# Patient Record
Sex: FEMALE | Race: WHITE | Employment: FULL TIME | URBAN - METROPOLITAN AREA
[De-identification: names, ages, dates, MRNs, and addresses within clinical notes are randomized per-mention and may not be internally consistent; named-entity substitution may affect disease eponyms.]

---

## 2021-04-28 ENCOUNTER — APPOINTMENT (OUTPATIENT)
Dept: CT IMAGING | Age: 51
End: 2021-04-28
Payer: COMMERCIAL

## 2021-04-28 ENCOUNTER — HOSPITAL ENCOUNTER (EMERGENCY)
Age: 51
Discharge: HOME | End: 2021-04-28
Attending: EMERGENCY MEDICINE
Payer: COMMERCIAL

## 2021-04-28 VITALS
OXYGEN SATURATION: 97 % | DIASTOLIC BLOOD PRESSURE: 70 MMHG | WEIGHT: 110 LBS | HEART RATE: 58 BPM | SYSTOLIC BLOOD PRESSURE: 132 MMHG | RESPIRATION RATE: 18 BRPM | HEIGHT: 62 IN | BODY MASS INDEX: 20.24 KG/M2 | TEMPERATURE: 97.6 F

## 2021-04-28 DIAGNOSIS — S09.90XA INJURY OF HEAD, INITIAL ENCOUNTER: ICD-10-CM

## 2021-04-28 DIAGNOSIS — S02.610A CLOSED FRACTURE OF CONDYLAR PROCESS OF MANDIBLE, UNSPECIFIED LATERALITY, INITIAL ENCOUNTER (HCC): Primary | ICD-10-CM

## 2021-04-28 DIAGNOSIS — R55 SYNCOPE AND COLLAPSE: ICD-10-CM

## 2021-04-28 DIAGNOSIS — S01.81XA CHIN LACERATION, INITIAL ENCOUNTER: ICD-10-CM

## 2021-04-28 LAB
A/G RATIO: 1.7 (ref 1.1–2.2)
ALBUMIN SERPL-MCNC: 4 G/DL (ref 3.4–5)
ALP BLD-CCNC: 84 U/L (ref 40–129)
ALT SERPL-CCNC: 11 U/L (ref 10–40)
ANION GAP SERPL CALCULATED.3IONS-SCNC: 10 MMOL/L (ref 3–16)
AST SERPL-CCNC: 20 U/L (ref 15–37)
BASOPHILS ABSOLUTE: 0.1 K/UL (ref 0–0.2)
BASOPHILS RELATIVE PERCENT: 0.9 %
BILIRUB SERPL-MCNC: <0.2 MG/DL (ref 0–1)
BUN BLDV-MCNC: 7 MG/DL (ref 7–20)
CALCIUM SERPL-MCNC: 8.4 MG/DL (ref 8.3–10.6)
CHLORIDE BLD-SCNC: 102 MMOL/L (ref 99–110)
CO2: 21 MMOL/L (ref 21–32)
CREAT SERPL-MCNC: 0.7 MG/DL (ref 0.6–1.1)
EKG ATRIAL RATE: 47 BPM
EKG DIAGNOSIS: NORMAL
EKG P AXIS: 76 DEGREES
EKG P-R INTERVAL: 148 MS
EKG Q-T INTERVAL: 432 MS
EKG QRS DURATION: 72 MS
EKG QTC CALCULATION (BAZETT): 382 MS
EKG R AXIS: 78 DEGREES
EKG T AXIS: 59 DEGREES
EKG VENTRICULAR RATE: 47 BPM
EOSINOPHILS ABSOLUTE: 0.1 K/UL (ref 0–0.6)
EOSINOPHILS RELATIVE PERCENT: 1.4 %
GFR AFRICAN AMERICAN: >60
GFR NON-AFRICAN AMERICAN: >60
GLOBULIN: 2.4 G/DL
GLUCOSE BLD-MCNC: 110 MG/DL (ref 70–99)
HCT VFR BLD CALC: 35.6 % (ref 36–48)
HEMOGLOBIN: 11.9 G/DL (ref 12–16)
LYMPHOCYTES ABSOLUTE: 1.6 K/UL (ref 1–5.1)
LYMPHOCYTES RELATIVE PERCENT: 24.9 %
MCH RBC QN AUTO: 30 PG (ref 26–34)
MCHC RBC AUTO-ENTMCNC: 33.3 G/DL (ref 31–36)
MCV RBC AUTO: 90.3 FL (ref 80–100)
MONOCYTES ABSOLUTE: 0.5 K/UL (ref 0–1.3)
MONOCYTES RELATIVE PERCENT: 8.7 %
NEUTROPHILS ABSOLUTE: 4 K/UL (ref 1.7–7.7)
NEUTROPHILS RELATIVE PERCENT: 64.1 %
PDW BLD-RTO: 14.7 % (ref 12.4–15.4)
PLATELET # BLD: 197 K/UL (ref 135–450)
PMV BLD AUTO: 7.1 FL (ref 5–10.5)
POTASSIUM REFLEX MAGNESIUM: 4.4 MMOL/L (ref 3.5–5.1)
RBC # BLD: 3.94 M/UL (ref 4–5.2)
SODIUM BLD-SCNC: 133 MMOL/L (ref 136–145)
TOTAL PROTEIN: 6.4 G/DL (ref 6.4–8.2)
TROPONIN: <0.01 NG/ML
WBC # BLD: 6.2 K/UL (ref 4–11)

## 2021-04-28 PROCEDURE — 84484 ASSAY OF TROPONIN QUANT: CPT

## 2021-04-28 PROCEDURE — 6370000000 HC RX 637 (ALT 250 FOR IP): Performed by: PHYSICIAN ASSISTANT

## 2021-04-28 PROCEDURE — 85025 COMPLETE CBC W/AUTO DIFF WBC: CPT

## 2021-04-28 PROCEDURE — 72125 CT NECK SPINE W/O DYE: CPT

## 2021-04-28 PROCEDURE — 70486 CT MAXILLOFACIAL W/O DYE: CPT

## 2021-04-28 PROCEDURE — 99284 EMERGENCY DEPT VISIT MOD MDM: CPT

## 2021-04-28 PROCEDURE — 12011 RPR F/E/E/N/L/M 2.5 CM/<: CPT

## 2021-04-28 PROCEDURE — 90715 TDAP VACCINE 7 YRS/> IM: CPT | Performed by: PHYSICIAN ASSISTANT

## 2021-04-28 PROCEDURE — 93010 ELECTROCARDIOGRAM REPORT: CPT | Performed by: INTERNAL MEDICINE

## 2021-04-28 PROCEDURE — 80053 COMPREHEN METABOLIC PANEL: CPT

## 2021-04-28 PROCEDURE — 6360000002 HC RX W HCPCS: Performed by: PHYSICIAN ASSISTANT

## 2021-04-28 PROCEDURE — 90471 IMMUNIZATION ADMIN: CPT | Performed by: PHYSICIAN ASSISTANT

## 2021-04-28 PROCEDURE — 93005 ELECTROCARDIOGRAM TRACING: CPT | Performed by: PHYSICIAN ASSISTANT

## 2021-04-28 PROCEDURE — 70450 CT HEAD/BRAIN W/O DYE: CPT

## 2021-04-28 RX ORDER — IBUPROFEN 600 MG/1
600 TABLET ORAL
Qty: 21 TABLET | Refills: 0 | Status: SHIPPED | OUTPATIENT
Start: 2021-04-28 | End: 2021-05-05

## 2021-04-28 RX ORDER — CEPHALEXIN 500 MG/1
500 CAPSULE ORAL 3 TIMES DAILY
Qty: 21 CAPSULE | Refills: 0 | Status: SHIPPED | OUTPATIENT
Start: 2021-04-28 | End: 2021-05-05

## 2021-04-28 RX ORDER — HYDROCODONE BITARTRATE AND ACETAMINOPHEN 5; 325 MG/1; MG/1
1 TABLET ORAL EVERY 6 HOURS PRN
Qty: 20 TABLET | Refills: 0 | Status: SHIPPED | OUTPATIENT
Start: 2021-04-28 | End: 2021-05-03

## 2021-04-28 RX ORDER — ACETAMINOPHEN 500 MG
1000 TABLET ORAL ONCE
Status: COMPLETED | OUTPATIENT
Start: 2021-04-28 | End: 2021-04-28

## 2021-04-28 RX ORDER — ACETAMINOPHEN 500 MG
1000 TABLET ORAL
Qty: 42 TABLET | Refills: 0 | Status: SHIPPED | OUTPATIENT
Start: 2021-04-28 | End: 2021-05-05

## 2021-04-28 RX ORDER — LIDOCAINE HYDROCHLORIDE AND EPINEPHRINE 10; 10 MG/ML; UG/ML
20 INJECTION, SOLUTION INFILTRATION; PERINEURAL ONCE
Status: DISCONTINUED | OUTPATIENT
Start: 2021-04-28 | End: 2021-04-28 | Stop reason: HOSPADM

## 2021-04-28 RX ADMIN — TETANUS TOXOID, REDUCED DIPHTHERIA TOXOID AND ACELLULAR PERTUSSIS VACCINE, ADSORBED 0.5 ML: 5; 2.5; 8; 8; 2.5 SUSPENSION INTRAMUSCULAR at 13:46

## 2021-04-28 RX ADMIN — ACETAMINOPHEN 1000 MG: 500 TABLET ORAL at 13:47

## 2021-04-28 ASSESSMENT — PAIN SCALES - GENERAL: PAINLEVEL_OUTOF10: 7

## 2021-04-28 NOTE — ED PROVIDER NOTES
Glucose 110 (*)     All other components within normal limits    Narrative:     Performed at:  [facility]  [address] [facility], [address]   Phone [phone]   TR[place]ON[place]    Narrative:     Performed at:  [facility]  [address] [facility], [address]   Phone [phone]           EKG:    Sinus bradycardia at a rate of 47 beats a minute with no acute ST elevations or depressions or pathologic Q waves. Exam:    Well-nourished female in no acute distress. When I saw the patient, she had a rate of around 60 beats a minute. She was neurologically intact with no focal findings. She is not drooling. There were no deformities noted. The patient was neurologically intact with no focal motor or sensory deficits throughout. She ambulated without difficulty. She was asymptomatic. Medical decision makin-year-old female presents after syncopal episode after hitting her elbow from the pain. The patient's EKG was initially bradycardic but she had a rate around 60 when I examined her. She is not on beta-blockers. Her cardiac work-up was unremarkable and normal.  However, the patient did have bilateral ramus fractures and we called and discussed the case with our ear nose and throat physician on-call, Dr. Jari Castleman, who is very helpful and will be following up with the patient promptly for her jaw fractures. In the meantime, the patient is to eat a soft diet. She is to return if worse. Again, the patient was hemodynamically stable during discharge. Finally, the patient did have a chin laceration that was repaired without difficulty. FINAL IMPRESSION:    1. Closed fracture of condylar process of mandible, unspecified laterality, initial encounter ([facility] [address]    2. Syncope and collapse    3. Chin laceration, initial encounter    4.  Injury of head, initial encounter           Larry Jenkins MD  [address] [facility]

## 2021-04-28 NOTE — ED NOTES
Bed: Bay-02  Expected date:   Expected time:   Means of arrival:   Comments:  leonel Bridges  04/28/21 1241

## 2021-04-28 NOTE — ED PROVIDER NOTES
[facility]        Pt Name: Parish Goodwin  MRN: [id number]  [place] 1970  Date of evaluation: 4/28/2021  Provider: Carolina Viramontes PA-C  PCP: No primary care provider on file. I have seen and evaluated this patient with my supervising physician Zan Bennett MD.    CHIEF COMPLAINT       Chief Complaint   Patient presents with    Loss of Consciousness     pt at work had blacked out, fell and hit her chin.  per ems pt hypotensive, 85/p.  laceration to chin       HISTORY OF PRESENT ILLNESS   (Location, Timing/Onset, Context/Setting, Quality, Duration, Modifying Factors, Severity, Associated Signs and Symptoms)  Note limiting factors. Parish Goodwin is a 46 y.o. female patient presenting from work. Patient works on a farm. This injury incident occurred at approximately 12 noon today. Hay bale apparently broke and she was picking up the loose hay. Struck the medial aspect of the left elbow on a metal door. This caused pain. She was already somewhat fatigued from working outdoors and somewhat overheated. She apparently sustained a syncopal event. Could be related to no breakfast, sudden pain and overheating resulting in the syncopal event. However, the patient did fall forward striking chin causing a 1.5 cm transverse laceration. Complaining of bilateral TMJ tenderness at this time. Complaining slight headache. No neck pain complaint. Not on anticoagulants. Tetanus shot not updated. Patient does indicate to me feeling pain in the elbow, lightheaded sensation beginning to fall forward. She then awakens with help from her coworkers. States this is not happened previously. EMS upon arrival reporting her blood pressure at 85 palp. BP upon ED arrival is 132/70 with a pulse of 58. She is not on beta-blockers or regular maintenance medication. She has no history of hypertension, hyperlipidemia, diabetes.   She does have history of hysterectomy. Nursing Notes were all reviewed and agreed with or any disagreements were addressed in the HPI. REVIEW OF SYSTEMS    (2-9 systems for level 4, 10 or more for level 5)     Review of Systems    Positives and Pertinent negatives as per HPI. Except as noted above in the ROS, all other systems were reviewed and negative. PAST MEDICAL HISTORY   History reviewed. No pertinent past medical history. SURGICAL HISTORY     Past Surgical History:   Procedure Laterality Date    HYSTERECTOMY      ORTHOPEDIC SURGERY      on rt abscess/Dr. Brianna Solomon       Discharge Medication List as of 4/28/2021  4:08 PM      CONTINUE these medications which have NOT CHANGED    Details   sulfamethoxazole-trimethoprim (BACTRIM DS;SEPTRA DS) 800-160 MG per tablet Take 1 tablet by mouth 2 times daily. PREDNISONE by Does not apply route. ALLERGIES     Patient has no known allergies. FAMILYHISTORY     History reviewed. No pertinent family history. SOCIAL HISTORY       Social History     Tobacco Use    Smoking status: Current Every Day Smoker     Packs/day: 1.00    Smokeless tobacco: Never Used   Substance Use Topics    Alcohol use: No    Drug use: No       SCREENINGS             PHYSICAL EXAM    (up to 7 for level 4, 8 or more for level 5)     ED Triage Vitals [04/28/21 1250]   BP Temp Temp src Pulse Resp SpO2 Height Weight   132/70 97.6 °F (36.4 °C) -- 58 18 97 % 5' 2\" (1.575 m) 110 lb (49.9 kg)       Physical Exam  Vitals signs and nursing note reviewed. Constitutional:       Appearance: Normal appearance. She is well-developed and normal weight. HENT:      Head: Normocephalic. Comments: The patient bilateral TMJ tenderness. No clear mandibular tenderness. She does have dentures. States pain with opening closure.      Right Ear: Tympanic membrane, ear canal and external ear normal.      Left Ear: Tympanic membrane, ear canal and external ear normal.      Nose: Nose normal.      Mouth/Throat:      Mouth: Mucous membranes are moist.      Pharynx: Oropharynx is clear. Eyes:      General: No scleral icterus. Right eye: No discharge. Left eye: No discharge. Conjunctiva/sclera: Conjunctivae normal.   Neck:      Musculoskeletal: Normal range of motion and neck supple. No muscular tenderness. Cardiovascular:      Rate and Rhythm: Normal rate and regular rhythm. Heart sounds: Normal heart sounds. Pulmonary:      Effort: Pulmonary effort is normal.      Breath sounds: Normal breath sounds. Abdominal:      General: Abdomen is flat. Bowel sounds are normal.      Palpations: Abdomen is soft. Musculoskeletal: Normal range of motion. General: No tenderness or signs of injury. Skin:     General: Skin is warm and dry. Neurological:      General: No focal deficit present. Mental Status: She is alert and oriented to person, place, and time. Mental status is at baseline. Psychiatric:         Mood and Affect: Mood normal.         Behavior: Behavior normal.         Thought Content:  Thought content normal.         Judgment: Judgment normal.         DIAGNOSTIC RESULTS   LABS:    Labs Reviewed   CBC WITH AUTO DIFFERENTIAL - Abnormal; Notable for the following components:       Result Value    RBC 3.94 (*)     Hemoglobin 11.9 (*)     Hematocrit 35.6 (*)     All other components within normal limits    Narrative:     Performed at:  [facility] [address], [place], [facility] [email or web address]   Phone [phone]   COMPREHENSIVE METABOLIC PANEL W/ REFLEX TO MG FOR LOW K - Abnormal; Notable for the following components:    Sodium 133 (*)     Glucose 110 (*)     All other components within normal limits    Narrative:     Performed at:  [facility]  [address] [facility] [email or web address]   Phone [phone]   TROPONIN    Narrative:     Performed at:  [facility]  [address], [address]   Phone [phone]       All other labs were within normal range or not returned as of this dictation. EKG: All EKG's are interpreted by the Emergency Department Physician in the absence of a cardiologist.  Please see their note for interpretation of EKG. RADIOLOGY:   Non-plain film images such as CT, Ultrasound and MRI are read by the radiologist. Plain radiographic images are visualized and preliminarily interpreted by the ED Provider with the below findings:        Interpretation per the Radiologist below, if available at the time of this note:    CT FACIAL BONES WO CONTRAST   Final Result   1. Laceration and contusion anterior to the mandible from recent injury. 2. Although the body of the mandible is intact, there are bilateral fractures   through the condylar processes with associated dislocation of the   temporomandibular joints. CT Cervical Spine WO Contrast   Preliminary Result   Multilevel degenerative changes of the cervical spine with no radiographic   findings to suggest presence of acute fracture. CT Head WO Contrast   Preliminary Result   1. No evidence of acute intracranial abnormality. 2. Findings consistent with presence of fractures in the region of the   mandibular condyles bilaterally. Finding will be evaluated in greater detail   on the CT examination of the facial bones which has been obtained at the same   sitting. No results found. PROCEDURES     I did use 1% likely with epinephrine to anesthetize the chin wound. Linear length 2 cm. Once anesthesia was noted it was draped in sterile fashion cleansed with chlorhexidine rinse and irrigated with saline. Mandible was identified. No foreign body or fracture of the mandible at this location. I did place 3 stitches to approximate the wound edges. Good results. Patient pleased.      Procedures    CRITICAL CARE TIME N/A    CONSULTS:  IP CONSULT TO OTOLARYNGOLOGY      EMERGENCY DEPARTMENT COURSE and DIFFERENTIAL DIAGNOSIS/MDM:   Vitals:    Vitals:    04/28/21 1250   BP: 132/70   Pulse: 58   Resp: 18   Temp: 97.6 °F (36.4 °C)   SpO2: 97%   Weight: 110 lb (49.9 kg)   Height: 5' 2\" (1.575 m)       Patient was given the following medications:  Medications   Tetanus-Diphth-Acell Pertussis (BOOSTRIX) injection 0.5 mL (0.5 mLs Intramuscular Given 4/28/21 1346)   acetaminophen (TYLENOL) tablet 1,000 mg (1,000 mg Oral Given 4/28/21 1347)         At 3:20 PM I discussed case with attending physician. He will evaluate the patient. Recommendation to contact our ENT service to see if they will take care of the patient. I did speak with ENT. He will manage the case. Patient will see him on Monday, May 3. Patient be discharged with Motrin, Tylenol and hydrocodone. She is aware not to chew regarding food. I discussed soup, pudding and mashed potato or similar food products. She is to minimize talking. Apply ice to the TM joint area. Sleep in a semisitting position. I did repair the chin laceration and recommend suture removal in approximately 7 and a more than 10 days by healthcare provider. She was given information on head injury. Patient did express understanding of her diagnosis and the treatment plan. FINAL IMPRESSION      1. Closed fracture of condylar process of mandible, unspecified laterality, initial encounter ([facility] [address]    2. Syncope and collapse    3. Chin laceration, initial encounter    4. Injury of head, initial encounter          DISPOSITION/PLAN   DISPOSITION Decision To Discharge 04/28/2021 03:58:30 PM      PATIENT REFERREDTO:  Ellyn Rose MD  [address] [place] [address]   [place]   [phone]    Schedule an appointment as soon as possible for a visit on 5/3/2021      [facility] Emergency Department  [address] [place] [address]  [phone]  Go to   If symptoms worsen      DISCHARGE MEDICATIONS:  Discharge Medication List as of 4/28/2021  4:08 PM      START taking these medications    Details   ibuprofen (ADVIL;MOTRIN) 600 MG tablet Take 1 tablet by mouth 3 times daily (with meals) for 7 days, Disp-21 tablet, R-0Print      acetaminophen (TYLENOL) 500 MG tablet Take 2 tablets by mouth 3 times daily (with meals) for 7 days, Disp-42 tablet, R-0Print      HYDROcodone-acetaminophen (NORCO) 5-325 MG per tablet Take 1 tablet by mouth every 6 hours as needed for Pain (Severe pain) for up to 5 days. , Disp-20 tablet, R-0Print      cephALEXin (KEFLEX) 500 MG capsule Take 1 capsule by mouth 3 times daily for 7 days, Disp-21 capsule, R-0Print             DISCONTINUED MEDICATIONS:  Discharge Medication List as of 4/28/2021  4:08 PM                 (Please note that portions of this note were completed with a voice recognition program.  Efforts were made to edit the dictations but occasionally words are mis-transcribed. )    Brittany Joyner PA-C (electronically signed)           Brittany Joyner PA-C  04/28/21 1940

## 2021-05-03 ENCOUNTER — OFFICE VISIT (OUTPATIENT)
Dept: ENT CLINIC | Age: 51
End: 2021-05-03
Payer: COMMERCIAL

## 2021-05-03 VITALS
DIASTOLIC BLOOD PRESSURE: 79 MMHG | SYSTOLIC BLOOD PRESSURE: 153 MMHG | HEART RATE: 99 BPM | BODY MASS INDEX: 20.85 KG/M2 | TEMPERATURE: 98.7 F | WEIGHT: 114 LBS

## 2021-05-03 DIAGNOSIS — R25.2 TRISMUS: ICD-10-CM

## 2021-05-03 DIAGNOSIS — S02.610A CLOSED FRACTURE OF CONDYLAR PROCESS OF MANDIBLE, UNSPECIFIED LATERALITY, INITIAL ENCOUNTER (HCC): Primary | ICD-10-CM

## 2021-05-03 DIAGNOSIS — S01.81XA CHIN LACERATION, INITIAL ENCOUNTER: ICD-10-CM

## 2021-05-03 PROCEDURE — 99244 OFF/OP CNSLTJ NEW/EST MOD 40: CPT | Performed by: STUDENT IN AN ORGANIZED HEALTH CARE EDUCATION/TRAINING PROGRAM

## 2021-05-03 NOTE — PROGRESS NOTES
[address] [place] [address] (:  1970) is a 46 y.o. female, here for evaluation of the following chief complaint(s): Other (broken jaw from fall, has stitches in the chin)      ASSESSMENT/PLAN:  1. Closed fracture of condylar process of mandible, unspecified laterality, initial encounter ([facility] [address]  2. Trismus  3. Chin laceration, initial encounter      This is a very pleasant 46 y.o. female here today for evaluation of the the above-noted complaints. The patient has radiographic evidence of bilateral fractures of her condylar processes left greater than right with derangement of the temporomandibular joints. She additionally has limited mouth opening on exam.    I had a lengthy discussion with the patient regarding different treatment options including open reduction and internal fixation versus MMF versus a soft diet. We discussed the risks and benefits of each treatment method. Her treatment is complicated by the fact that she is edentulous. I did discuss with her that if she were to want an opinion as to whether or not an open reduction and internal fixation would be appropriate, then I would [facility] with her oral maxillofacial surgeons. An alternative approach that we could consider would be to affix her dentures to her alveolar ridges the screws and then put hybrid arch bars on her dentures to get her into occlusion for approximately 3 to 4 weeks. Finally we discussed she could try a soft diet, or based on the amount of dislocation of the condylar process I am fearful that she will develop significant arthralgia and trismus. The patient was very tearful throughout the discussion is very fearful of proceeding with any type of surgery. She would like to think about this and will call our office at the end the week to follow-up.   I did discuss with her at the time sensitive nature of this matter and encouraged her to follow-up with me as soon as possible so that if she desires surgical treatment we can work towards getting this set up. Regarding her chin laceration, I was able to remove her sutures today. The laceration is healing well. I provided her with bacitracin to apply to the incision. SUBJECTIVE/OBJECTIVE:  [facility]    Maritza Coyne is here today for evaluation of facial fractures. She was at work when she hit her elbow really hard and this caused her to fall to the ground. She struck her chin on the concrete and went to the emergency department where a CT scan of the facial skeleton was performed. I independently reviewed this and it showed evidence of bilateral condylar process fractures left greater than right with derangement of the TMJs. There also appeared to be a fracture through eustachian tube and anterior wall of the external auditory canal.  She now has some decreased range of motion and opening of her mouth. She has moderate pain which she is treating with Tylenol and ibuprofen. She is edentulous. She smokes. REVIEW OF SYSTEMS  The following systems were reviewed and revealed the following in addition to any already discussed in the HPI:    PHYSICAL EXAM    GENERAL: No acute distress, alert and oriented, no hoarseness, strong voice  EYES: EOMI, Anti-icteric  HENT:   Head: Normocephalic and atraumatic.    Face:  Symmetric, facial nerve intact, no sinus tenderness  Right Ear: Normal external ear, normal external auditory canal, intact tympanic membrane with normal mobility and aerated middle ear  Left Ear: Normal external ear, normal external auditory canal, intact tympanic membrane with normal mobility and aerated middle ear  Mouth/Oral Cavity:  normal lips, Uvula is midline, no mucosal lesions, no trismus, without dentition, normal salivary quality/flow  Oropharynx/Larynx:  normal oropharynx, 1+ tonsils; patient did not tolerate mirror exam due to excessive gag reflex  Nose:Normal external nasal appearance. Anterior rhinoscopy shows deviated a deviated septum preventing view posteriorly. Normal turbinates. Normal mucosa   NECK: Normal range of motion, no thyromegaly, trachea is midline, no lymphadenopathy, no neck masses, no crepitus  CHEST: Normal respiratory effort, no retractions, breathing comfortably  SKIN: No rashes, normal appearing skin, no evidence of skin lesions/tumors  Neuro:  cranial nerve II-XII intact; normal gait  Cardio:  no edema    There is a moderate degree of trismus. There is tenderness about the bilateral TMJs left greater than right. Her bite does not appear significantly off with her dentures in. PROCEDURE      This note was generated completely or in part utilizing Dragon dictation speech recognition software. Occasionally, words are mistranscribed and despite editing, the text may contain inaccuracies due to incorrect word recognition. If further clarification is needed please contact the office at [phone]. An electronic signature was used to authenticate this note.     --Juan Collins MD

## 2021-05-10 ENCOUNTER — OFFICE VISIT (OUTPATIENT)
Dept: ENT CLINIC | Age: 51
End: 2021-05-10
Payer: COMMERCIAL

## 2021-05-10 VITALS — HEART RATE: 112 BPM | TEMPERATURE: 97.1 F | SYSTOLIC BLOOD PRESSURE: 144 MMHG | DIASTOLIC BLOOD PRESSURE: 86 MMHG

## 2021-05-10 DIAGNOSIS — S02.610A CLOSED FRACTURE OF CONDYLAR PROCESS OF MANDIBLE, UNSPECIFIED LATERALITY, INITIAL ENCOUNTER (HCC): Primary | ICD-10-CM

## 2021-05-10 DIAGNOSIS — R25.2 TRISMUS: ICD-10-CM

## 2021-05-10 DIAGNOSIS — S01.81XA CHIN LACERATION, INITIAL ENCOUNTER: ICD-10-CM

## 2021-05-10 PROCEDURE — 99214 OFFICE O/P EST MOD 30 MIN: CPT | Performed by: STUDENT IN AN ORGANIZED HEALTH CARE EDUCATION/TRAINING PROGRAM

## 2021-05-10 NOTE — PROGRESS NOTES
The patient is very fearful to proceed with any surgery and wishes to try conservative therapy at this time despite the risks. I will plan to see her back several month to monitor her healing. I will see her sooner should she develop any issues. Regarding her chin laceration, the incision is healing very well. I have asked her to apply sunscreen to this when going out in the sun. SUBJECTIVE/OBJECTIVE:  [facility]    Maritza Coyne is here today for evaluation of facial fractures. She was at work when she hit her elbow really hard and this caused her to fall to the ground. She struck her chin on the concrete and went to the emergency department where a CT scan of the facial skeleton was performed. I independently reviewed this and it showed evidence of bilateral condylar process fractures left greater than right with derangement of the TMJs. There also appeared to be a fracture through eustachian tube and anterior wall of the external auditory canal.  She now has some decreased range of motion and opening of her mouth. She has moderate pain which she is treating with Tylenol and ibuprofen. She is edentulous. She smokes. Update 5/10/2021:    Patient presents today for follow-up. Her pain has improved. She is thinking about surgery and is very fearful to proceed with anything. She does not want metal plates in her face. She is eating a soft diet. She feels like her chin incision is healing well. REVIEW OF SYSTEMS  The following systems were reviewed and revealed the following in addition to any already discussed in the HPI:    PHYSICAL EXAM    GENERAL: No acute distress, alert and oriented, no hoarseness, strong voice  EYES: EOMI, Anti-icteric  HENT:   Head: Normocephalic and atraumatic.    Face:  Symmetric, facial nerve intact, no sinus tenderness  Right Ear: Normal external ear, normal external auditory canal, intact tympanic membrane with normal mobility and aerated middle ear  Left Ear: Normal external ear, normal external auditory canal, intact tympanic membrane with normal mobility and aerated middle ear  Mouth/Oral Cavity:  normal lips, Uvula is midline, no mucosal lesions, no trismus, without dentition, normal salivary quality/flow  Oropharynx/Larynx:  normal oropharynx, 1+ tonsils; patient did not tolerate mirror exam due to excessive gag reflex  Nose:Normal external nasal appearance. Anterior rhinoscopy shows deviated a deviated septum preventing view posteriorly. Normal turbinates. Normal mucosa   NECK: Normal range of motion, no thyromegaly, trachea is midline, no lymphadenopathy, no neck masses, no crepitus  CHEST: Normal respiratory effort, no retractions, breathing comfortably  SKIN: No rashes, normal appearing skin, no evidence of skin lesions/tumors  Neuro:  cranial nerve II-XII intact; normal gait  Cardio:  no edema    There is a moderate degree of trismus. There is tenderness about the bilateral TMJs left greater than right and this is improved from her last visit. Her bite does not appear significantly off with her dentures in. Her 2 cm chin laceration is healing well. The skin edges are well approximated. PROCEDURE      This note was generated completely or in part utilizing Dragon dictation speech recognition software. Occasionally, words are mistranscribed and despite editing, the text may contain inaccuracies due to incorrect word recognition. If further clarification is needed please contact the office at [phone]. An electronic signature was used to authenticate this note.     --Augustus Mirza MD

## 2021-08-04 ENCOUNTER — TELEPHONE (OUTPATIENT)
Dept: ENT CLINIC | Age: 51
End: 2021-08-04

## 2021-08-04 NOTE — TELEPHONE ENCOUNTER
Pt was scheduled on 8/9/21 with Dr. Akin Baez in [place]. Dr. Akin Baez is not in the office that day. I have attempted to contact the pt twice on each number on file to r/s appt but have been unsuccessful in reaching her. The VM on her home number is not set up, the work number is invalid, and VM box of the mobile number has the name of another person, so I left a HIPAA compliant message on the mobile number for her to return our call. No active MyChart, and her emergency contact (spouse) shares the same mobile number I attempted. If pt comes to the appt or calls back, please verify her contact information. Letter also mailed to pt's address on file.